# Patient Record
(demographics unavailable — no encounter records)

---

## 2025-04-10 NOTE — CARDIOLOGY SUMMARY
[de-identified] : 07/31/2024: NSR at 61 bpm, normal ECG. [de-identified] : TTE 06/23/2024: LVEF 55%, no significant valvular disease. Dilatation of the ascending aorta of 4.1 cm. [de-identified] : [facility] ILR 06/25/2024

## 2025-04-10 NOTE — ASSESSMENT
[FreeTextEntry1] : 75-year-old male with no significant PMHx presented to ER with right sided weakness after a fall from a ladder.   He underwent ILR implantation on 06/25/2024 for a long-term cardiac arrhythmia monitoring to r/o arrhythmogenic causes of the stroke and presents today for device interrogation.  Wound is well healed. There are no signs or symptoms of infection of inflammation. There is no redness, no swelling, no erythema and the patient has no pain.  - Device interrogation normal. No events on interrogation. - The patient is enrolled in remote monitoring services.   -[id number] with the neurologist as scheduled.  -Follow-up in 9 to 12 months or prn.   I have also advised the patient to go to the nearest emergency room if he experiences any chest pain, dyspnea, syncope, or has any other compelling symptoms.

## 2025-04-10 NOTE — PROCEDURE
[No] : not [NSR] : normal sinus rhythm [See Device Printout] : See device printout [Longevity: ___ months] : The estimated remaining battery life is [unfilled] months [None] : none [Sensing Amplitude ___mv] : sensing amplitude was [unfilled] mv [de-identified] : 68 bpm [de-identified] : [employer] ILR  [de-identified] : LINQ II [de-identified] : [id number] [de-identified] : 06/25/2024 [de-identified] : Good [de-identified] : Normal device function. No episodes. The patient is enrolled on remote monitoring.

## 2025-04-10 NOTE — HISTORY OF PRESENT ILLNESS
[de-identified] : 75M w/ no significant PMHx presented to ER on 06/22/2024 with right-sided weakness after a fall from a ladder. He reported that he fell backwards from the ladder and hit the base of his head on a flowerpot when he fell. Since the fall he feels his R side is weaker. He denies any loss of consciousness with the fall. He reports that he lost his footing coming down the ladder and fell.  He underwent ILR implantation on 06/25/2024 for a long-term cardiac arrhythmia monitoring to r/o arrhythmogenic causes of the stroke and presents today for device interrogation.  The patient denies chest pain/discomfort, dyspnea, palpitations, dizziness, lightheadedness and syncope. Patient is active and goes to the gym 3-4x/week. On NO medications.

## 2025-04-10 NOTE — PHYSICAL EXAM
[Normal Appearance] : normal appearance [General Appearance - In No Acute Distress] : no acute distress [Heart Rate And Rhythm] : heart rate and rhythm were normal [Heart Sounds] : normal S1 and S2 [] : no respiratory distress [Respiration, Rhythm And Depth] : normal respiratory rhythm and effort [Exaggerated Use Of Accessory Muscles For Inspiration] : no accessory muscle use [Clean] : clean [Dry] : dry [Well-Healed] : well-healed [Nail Clubbing] : no clubbing of the fingernails [Cyanosis, Localized] : no localized cyanosis [Petechial Hemorrhages (___cm)] : no petechial hemorrhages [FreeTextEntry1] : parasternal

## 2025-04-15 NOTE — HISTORY OF PRESENT ILLNESS
[FreeTextEntry1] :  75 year old male patient seen in follow-up for BPH, elevated PSA, and history of CVA. He is overall doing reasonably well. He primarily complains of urgency and urge  incontinence in changes in position especially when sitting for a while and then he stands up. Denies hematuria, dysuria, or UTIs. He has nocturia x2 and sometimes just x1. He had gone off the Mirabegron and thinks things may be a little worse and we discussed re-trying the medication. He came in to do a flow today, however did not void enough to be valid.     Uroflow Results: Voiding time: 34.4 s Time to peak flow: 4.1 s Peak flow rate: 7 mL/s Average flow rate: 2.3 mL/s Voided volume: 53 mL PVR: 0 mL Samples Given: Cerave moisturizer and lotion Detail Level: Generalized

## 2025-04-15 NOTE — ADDENDUM
[FreeTextEntry1] :  WALT CASAS, am scribing for and in the presence of  in the following sections: HISTORY OF PRESENT ILLNESS, PAST MEDICAL/FAMILY/SOCIAL HISTORY, REVIEW OF SYSTEMS, VITAL SIGNS, PHYSICAL EXAM, ASSESSMENT/PLAN on 04/15/2025.